# Patient Record
Sex: FEMALE | Race: WHITE | Employment: FULL TIME | URBAN - METROPOLITAN AREA
[De-identification: names, ages, dates, MRNs, and addresses within clinical notes are randomized per-mention and may not be internally consistent; named-entity substitution may affect disease eponyms.]

---

## 2021-04-01 ENCOUNTER — APPOINTMENT (OUTPATIENT)
Dept: CT IMAGING | Age: 46
End: 2021-04-01
Attending: EMERGENCY MEDICINE
Payer: COMMERCIAL

## 2021-04-01 ENCOUNTER — APPOINTMENT (OUTPATIENT)
Dept: GENERAL RADIOLOGY | Age: 46
End: 2021-04-01
Attending: EMERGENCY MEDICINE
Payer: COMMERCIAL

## 2021-04-01 ENCOUNTER — HOSPITAL ENCOUNTER (EMERGENCY)
Age: 46
Discharge: HOME | End: 2021-04-01
Attending: EMERGENCY MEDICINE
Payer: COMMERCIAL

## 2021-04-01 VITALS
WEIGHT: 180 LBS | HEIGHT: 61 IN | RESPIRATION RATE: 16 BRPM | DIASTOLIC BLOOD PRESSURE: 74 MMHG | HEART RATE: 91 BPM | SYSTOLIC BLOOD PRESSURE: 104 MMHG | TEMPERATURE: 98.2 F | BODY MASS INDEX: 33.99 KG/M2 | OXYGEN SATURATION: 98 %

## 2021-04-01 DIAGNOSIS — M54.6 ACUTE LEFT-SIDED THORACIC BACK PAIN: Primary | ICD-10-CM

## 2021-04-01 LAB
ANION GAP SERPL CALC-SCNC: 8 MMOL/L (ref 5–15)
APPEARANCE UR: CLEAR
ATRIAL RATE: 82 BPM
BACTERIA URNS QL MICRO: NEGATIVE /HPF
BASOPHILS # BLD: 0 K/UL (ref 0–0.1)
BASOPHILS NFR BLD: 0 % (ref 0–1)
BILIRUB UR QL: NEGATIVE
BUN SERPL-MCNC: 14 MG/DL (ref 6–20)
BUN/CREAT SERPL: 17 (ref 12–20)
CA-I BLD-MCNC: 9 MG/DL (ref 8.5–10.1)
CALCULATED P AXIS, ECG09: 50 DEGREES
CALCULATED R AXIS, ECG10: 55 DEGREES
CALCULATED T AXIS, ECG11: 62 DEGREES
CHLORIDE SERPL-SCNC: 110 MMOL/L (ref 97–108)
CO2 SERPL-SCNC: 22 MMOL/L (ref 21–32)
COLOR UR: ABNORMAL
CREAT SERPL-MCNC: 0.82 MG/DL (ref 0.55–1.02)
D DIMER PPP FEU-MCNC: 0.41 UG/ML(FEU)
DIAGNOSIS, 93000: NORMAL
DIFFERENTIAL METHOD BLD: NORMAL
EOSINOPHIL # BLD: 0.1 K/UL (ref 0–0.4)
EOSINOPHIL NFR BLD: 1 % (ref 0–7)
ERYTHROCYTE [DISTWIDTH] IN BLOOD BY AUTOMATED COUNT: 12.2 % (ref 11.5–14.5)
GLUCOSE SERPL-MCNC: 78 MG/DL (ref 65–100)
GLUCOSE UR STRIP.AUTO-MCNC: NEGATIVE MG/DL
HCT VFR BLD AUTO: 41.2 % (ref 35–47)
HGB BLD-MCNC: 14.4 G/DL (ref 11.5–16)
HGB UR QL STRIP: NEGATIVE
IMM GRANULOCYTES # BLD AUTO: 0 K/UL (ref 0–0.04)
IMM GRANULOCYTES NFR BLD AUTO: 0 % (ref 0–0.5)
KETONES UR QL STRIP.AUTO: NEGATIVE MG/DL
LEUKOCYTE ESTERASE UR QL STRIP.AUTO: NEGATIVE
LYMPHOCYTES # BLD: 2.1 K/UL (ref 0.8–3.5)
LYMPHOCYTES NFR BLD: 23 % (ref 12–49)
MCH RBC QN AUTO: 32.4 PG (ref 26–34)
MCHC RBC AUTO-ENTMCNC: 35 G/DL (ref 30–36.5)
MCV RBC AUTO: 92.8 FL (ref 80–99)
MONOCYTES # BLD: 0.6 K/UL (ref 0–1)
MONOCYTES NFR BLD: 7 % (ref 5–13)
NEUTS SEG # BLD: 6.4 K/UL (ref 1.8–8)
NEUTS SEG NFR BLD: 69 % (ref 32–75)
NITRITE UR QL STRIP.AUTO: NEGATIVE
NRBC # BLD: 0 K/UL (ref 0–0.01)
NRBC BLD-RTO: 0 PER 100 WBC
P-R INTERVAL, ECG05: 124 MS
PH UR STRIP: >8.5 [PH] (ref 5–8)
PLATELET # BLD AUTO: 232 K/UL (ref 150–400)
PMV BLD AUTO: 10.3 FL (ref 8.9–12.9)
POTASSIUM SERPL-SCNC: 3.5 MMOL/L (ref 3.5–5.1)
PROT UR STRIP-MCNC: NEGATIVE MG/DL
Q-T INTERVAL, ECG07: 372 MS
QRS DURATION, ECG06: 72 MS
QTC CALCULATION (BEZET), ECG08: 434 MS
RBC # BLD AUTO: 4.44 M/UL (ref 3.8–5.2)
RBC #/AREA URNS HPF: ABNORMAL /HPF (ref 0–5)
SODIUM SERPL-SCNC: 140 MMOL/L (ref 136–145)
SP GR UR REFRACTOMETRY: <1.005 (ref 1–1.03)
TROPONIN I SERPL-MCNC: <0.05 NG/ML
UROBILINOGEN UR QL STRIP.AUTO: 0.1 EU/DL (ref 0.1–1)
VENTRICULAR RATE, ECG03: 82 BPM
WBC # BLD AUTO: 9.1 K/UL (ref 3.6–11)
WBC URNS QL MICRO: ABNORMAL /HPF (ref 0–4)

## 2021-04-01 PROCEDURE — 81001 URINALYSIS AUTO W/SCOPE: CPT

## 2021-04-01 PROCEDURE — 71045 X-RAY EXAM CHEST 1 VIEW: CPT

## 2021-04-01 PROCEDURE — 84484 ASSAY OF TROPONIN QUANT: CPT

## 2021-04-01 PROCEDURE — 74176 CT ABD & PELVIS W/O CONTRAST: CPT

## 2021-04-01 PROCEDURE — 93005 ELECTROCARDIOGRAM TRACING: CPT

## 2021-04-01 PROCEDURE — 80048 BASIC METABOLIC PNL TOTAL CA: CPT

## 2021-04-01 PROCEDURE — 85025 COMPLETE CBC W/AUTO DIFF WBC: CPT

## 2021-04-01 PROCEDURE — 36415 COLL VENOUS BLD VENIPUNCTURE: CPT

## 2021-04-01 PROCEDURE — 85379 FIBRIN DEGRADATION QUANT: CPT

## 2021-04-01 PROCEDURE — 99285 EMERGENCY DEPT VISIT HI MDM: CPT

## 2021-04-01 RX ORDER — METHOCARBAMOL 500 MG/1
500 TABLET, FILM COATED ORAL 4 TIMES DAILY
Qty: 10 TAB | Refills: 0 | Status: SHIPPED | OUTPATIENT
Start: 2021-04-01

## 2021-04-01 RX ORDER — NAPROXEN 500 MG/1
500 TABLET ORAL
Qty: 6 TAB | Refills: 0 | Status: SHIPPED | OUTPATIENT
Start: 2021-04-01

## 2021-04-01 NOTE — ED PROVIDER NOTES
EMERGENCY DEPARTMENT HISTORY AND PHYSICAL EXAM      Date: 4/1/2021  Patient Name: Michael Quiñones      History of Presenting Illness     Chief Complaint   Patient presents with    Back Pain       History Provided By: Patient    HPI: Michael Quiñones, 41 y.o. female with a past medical history significant for pulmonary embolism and she remains on Eliquis who Presents to the ED with cc of left upper back pain that was sharp that started today. She said it did not have any exacerbating or relieving factors and did not radiate. She did not treated with anything but rather called EMS immediately. She is not had anything like this before and she denies any fever, chills, nausea, vomiting, chest pain, shortness of breath, rash, diarrhea, headache, night sweats, weight loss. Symptoms are mild to moderate and much improved at this point time    There are no other complaints, changes, or physical findings at this time. PCP: Unknown, Provider        Past History     Past Medical History:  Past Medical History:   Diagnosis Date    Thromboembolus [facility])        Past Surgical History:  Past Surgical History:   Procedure Laterality Date    HX GYN         Family History:  No family history on file. Social History:  Social History     Tobacco Use    Smoking status: Never Smoker    Smokeless tobacco: Never Used   Substance Use Topics    Alcohol use: Not on file    Drug use: Not on file       Allergies: Allergies   Allergen Reactions    Sulfa (Sulfonamide Antibiotics) Hives         Review of Systems     Review of Systems   Constitutional: Negative. Negative for appetite change, chills, fatigue and fever. HENT: Negative. Negative for congestion and sinus pain. Eyes: Negative. Negative for pain and visual disturbance. Respiratory: Negative. Negative for chest tightness and shortness of breath. Cardiovascular: Negative. Negative for chest pain. Gastrointestinal: Negative.   Negative for abdominal pain, diarrhea, nausea and vomiting. Genitourinary: Negative. Negative for difficulty urinating. No discharge   Musculoskeletal: Positive for back pain. Negative for arthralgias. Skin: Negative. Negative for rash. Neurological: Negative. Negative for weakness and headaches. Hematological: Negative. Psychiatric/Behavioral: Negative. Negative for agitation. The patient is not nervous/anxious. All other systems reviewed and are negative. Physical Exam     Physical Exam  Vitals signs and nursing note reviewed. Constitutional:       General: She is not in acute distress. Appearance: She is well-developed. HENT:      Head: Normocephalic and atraumatic. Nose: Nose normal.      Mouth/Throat:      Mouth: Mucous membranes are moist.      Pharynx: Oropharynx is clear. No oropharyngeal exudate. Eyes:      General:         Right eye: No discharge. Left eye: No discharge. Conjunctiva/sclera: Conjunctivae normal.      Pupils: Pupils are equal, round, and reactive to light. Neck:      Musculoskeletal: Normal range of motion and neck supple. Cardiovascular:      Rate and Rhythm: Normal rate and regular rhythm. Chest Wall: PMI is not displaced. No thrill. Heart sounds: Normal heart sounds. No murmur. No friction rub. No gallop. Pulmonary:      Effort: Pulmonary effort is normal. No respiratory distress. Breath sounds: Normal breath sounds. No wheezing or rales. Chest:      Chest wall: No tenderness. Abdominal:      General: Bowel sounds are normal. There is no distension. Palpations: Abdomen is soft. There is no mass. Tenderness: There is no abdominal tenderness. There is no guarding or rebound. Musculoskeletal: Normal range of motion. Lymphadenopathy:      Cervical: No cervical adenopathy. Skin:     General: Skin is warm and dry. Capillary Refill: Capillary refill takes less than 2 seconds. Findings: No erythema or rash. Neurological:      Mental Status: She is alert and oriented to person, place, and time. Cranial Nerves: No cranial nerve deficit. Coordination: Coordination normal.   Psychiatric:         Mood and Affect: Mood normal.         Behavior: Behavior normal.         Lab and Diagnostic Study Results     Labs -     Recent Results (from the past 12 hour(s))   CBC WITH AUTOMATED DIFF    Collection Time: 04/01/21 12:00 PM   Result Value Ref Range    WBC 9.1 3.6 - 11.0 K/uL    RBC 4.44 3.80 - 5.20 M/uL    HGB 14.4 11.5 - 16.0 g/dL    HCT 41.2 35.0 - 47.0 %    MCV 92.8 80.0 - 99.0 FL    MCH 32.4 26.0 - 34.0 PG    MCHC 35.0 30.0 - 36.5 g/dL    RDW 12.2 11.5 - 14.5 %    PLATELET 696 215 - 483 K/uL    MPV 10.3 8.9 - 12.9 FL    NRBC 0.0 0  WBC    ABSOLUTE NRBC 0.00 0.00 - 0.01 K/uL    NEUTROPHILS 69 32 - 75 %    LYMPHOCYTES 23 12 - 49 %    MONOCYTES 7 5 - 13 %    EOSINOPHILS 1 0 - 7 %    BASOPHILS 0 0 - 1 %    IMMATURE GRANULOCYTES 0 0.0 - 0.5 %    ABS. NEUTROPHILS 6.4 1.8 - 8.0 K/UL    ABS. LYMPHOCYTES 2.1 0.8 - 3.5 K/UL    ABS. MONOCYTES 0.6 0.0 - 1.0 K/UL    ABS. EOSINOPHILS 0.1 0.0 - 0.4 K/UL    ABS. BASOPHILS 0.0 0.0 - 0.1 K/UL    ABS. IMM.  GRANS. 0.0 0.00 - 0.04 K/UL    DF AUTOMATED     METABOLIC PANEL, BASIC    Collection Time: 04/01/21 12:00 PM   Result Value Ref Range    Sodium 140 136 - 145 mmol/L    Potassium 3.5 3.5 - 5.1 mmol/L    Chloride 110 (H) 97 - 108 mmol/L    CO2 22 21 - 32 mmol/L    Anion gap 8 5 - 15 mmol/L    Glucose 78 65 - 100 mg/dL    BUN 14 6 - 20 mg/dL    Creatinine 0.82 0.55 - 1.02 mg/dL    BUN/Creatinine ratio 17 12 - 20      GFR est AA >60 >60 ml/min/1.73m2    GFR est non-AA >60 >60 ml/min/1.73m2    Calcium 9.0 8.5 - 10.1 mg/dL   URINALYSIS W/MICROSCOPIC    Collection Time: 04/01/21 12:00 PM   Result Value Ref Range    Color Yellow/Straw      Appearance Clear Clear      Specific gravity <1.005 1.003 - 1.030    pH (UA) >8.5 (H) 5.0 - 8.0    Protein Negative Negative mg/dL    Glucose Negative Negative mg/dL    Ketone Negative Negative mg/dL    Bilirubin Negative Negative      Blood Negative Negative      Urobilinogen 0.1 0.1 - 1.0 EU/dL    Nitrites Negative Negative      Leukocyte Esterase Negative Negative      WBC 0-4 0 - 4 /hpf    RBC 0-5 0 - 5 /hpf    Bacteria Negative Negative /hpf   TROPONIN I    Collection Time: 04/01/21 12:00 PM   Result Value Ref Range    Troponin-I, Qt. <0.05 <0.05 ng/mL   D DIMER    Collection Time: 04/01/21 12:00 PM   Result Value Ref Range    D DIMER 0.41 <0.50 ug/ml(FEU)   EKG, 12 LEAD, INITIAL    Collection Time: 04/01/21 12:00 PM   Result Value Ref Range    Ventricular Rate 82 BPM    Atrial Rate 82 BPM    P-R Interval 124 ms    QRS Duration 72 ms    Q-T Interval 372 ms    QTC Calculation (Bezet) 434 ms    Calculated P Axis 50 degrees    Calculated R Axis 55 degrees    Calculated T Axis 62 degrees    Diagnosis       Normal sinus rhythm  Nonspecific ST abnormality  Abnormal ECG    Confirmed by Gregoria Golden ([id number]) on 4/1/2021 12:21:22 PM         Radiologic Studies -   [unfilled]  CT Results  (Last 48 hours)               04/01/21 1403  CT ABD PELV WO CONT Final result    Impression:  Small volume free fluid lower pelvis. 3.5 cm oblong cystic structure right ovary, likely complex ovarian cyst.       No calcified stone either kidney. No hydronephrosis. No bowel obstruction. No CT evidence for appendicitis or diverticulitis. Narrative:  CT abdomen and pelvis without IV contrast.       Axial images are reviewed along with reformatted sagittal/coronal images. Dose reduction: All CT scans at this facility are performed using dose reduction   optimization techniques as appropriate to a performed exam including the   following-   automated exposure control, adjustments of mA and/or Kv according to patient   size, or use of iterative reconstructive technique. Lung bases are clear.         Nonenhanced images reveal demarcated 1 cm hypodense site left lobe liver,   probable cyst. Gallbladder nondistended. Normal volume spleen. Pancreas,   bilateral adrenal glands, and bilateral kidneys appear unremarkable on this   nonenhanced study. No calcified stone either kidney. No hydronephrosis. Stomach and small bowel loops are decompressed. Appendix appears unremarkable. There is stool and air through colon. Small volume free fluid lower pelvis. [place] 3.5 cm cystic structure right   pelvis probable complex right ovarian cyst.       Normal caliber abdominal aorta. Tiny fat-containing periumbilical hernia. CXR Results  (Last 48 hours)               04/01/21 1310  XR CHEST PORT Final result    Impression:  The cardiomediastinal silhouette is appropriate for age, technique,   and lung expansion. Pulmonary vasculature is not congested. The lungs are   essentially clear. No effusion or pneumothorax is seen. Narrative:  1 view                 Medical Decision Making and ED Course   - I am the first and primary provider for this patient AND AM THE PRIMARY PROVIDER OF RECORD. - I reviewed the vital signs, available nursing notes, past medical history, past surgical history, family history and social history. - Initial assessment performed. The patients presenting problems have been discussed, and the staff are in agreement with the care plan formulated and outlined with them. I have encouraged them to ask questions as they arise throughout their visit. Vital Signs-Reviewed the patient's vital signs. Patient Vitals for the past 12 hrs:   Temp Pulse Resp BP SpO2   04/01/21 1418  91 16 104/74 98 %   04/01/21 1204     100 %   04/01/21 1204 98.2 °F (36.8 °C) 84 16 99/68 100 %   04/01/21 1152 98.2 °F (36.8 °C) 84 20 (!) 102/57 99 %       EKG interpretation: (Preliminary): Performed at 12:00, and read at 003  Ventricular rate 82 bpm,  ms, QRS duration 72 ms,  ms.   Interpretation: Normal sinus rhythm nonspecific ST abnormality. No abnormal EKG. Records Reviewed: Nursing Notes    The patient presents with back pain with a differential diagnosis of PE, kidney stone, muscle spasm. ED Course:       ED Course as of Apr 01 1538   Thu Apr 01, 2021   1532 She states her symptoms have completely resolved. She says it went from her left upper back to her right lower quadrant in the back but has since resolved. We have discussed all of her results and she conveys understanding at this point time. She has a follow-up with urology tomorrow    [CS]      ED Course User Index  [CS] Conchis Raya MD         Provider Notes (Medical Decision Making):   Consent resolved. Will treat with musculoskeletal  MDM           Consultations:       Consultations: - NONE        Procedures and Critical Care       Performed by: Trev Gonzalez MD  PROCEDURES:  Procedures         Disposition     Disposition: Condition stable    Discharged    Remove if not discharged  DISCHARGE PLAN:  1. There are no discharge medications for this patient. 2.   Follow-up Information     Follow up With Specialties Details Why [facility] EMERGENCY DEPT Emergency Medicine Call  As needed, If symptoms worsen [address] [address]  [phone]        3. Return to ED if worse   4. Current Discharge Medication List      START taking these medications    Details   naproxen (NAPROSYN) 500 mg tablet Take 1 Tab by mouth every twelve (12) hours as needed for Pain. Qty: 6 Tab, Refills: 0      methocarbamoL (Robaxin) 500 mg tablet Take 1 Tab by mouth four (4) times daily. Qty: 10 Tab, Refills: 0             Diagnosis     Clinical Impression:   1. Acute left-sided thoracic back pain        Attestations:    Trev Gonzalez MD    Please note that this dictation was completed with [employer], the [employer] voice recognition software.   Quite often unanticipated grammatical, syntax, homophones, and other interpretive errors are inadvertently transcribed by the computer software. Please disregard these errors. Please excuse any errors that have escaped final proofreading. Thank you.

## 2021-04-01 NOTE — DISCHARGE INSTRUCTIONS
Thank you! Thank you for allowing me to care for you in the emergency department. I sincerely hope that you are satisfied with your visit today. It is my goal to provide you with excellent care. Below you will find a list of your labs and imaging from your visit today. Should you have any questions regarding these results please do not hesitate to call the emergency department. Labs -     Recent Results (from the past 12 hour(s))   CBC WITH AUTOMATED DIFF    Collection Time: 04/01/21 12:00 PM   Result Value Ref Range    WBC 9.1 3.6 - 11.0 K/uL    RBC 4.44 3.80 - 5.20 M/uL    HGB 14.4 11.5 - 16.0 g/dL    HCT 41.2 35.0 - 47.0 %    MCV 92.8 80.0 - 99.0 FL    MCH 32.4 26.0 - 34.0 PG    MCHC 35.0 30.0 - 36.5 g/dL    RDW 12.2 11.5 - 14.5 %    PLATELET 698 252 - 034 K/uL    MPV 10.3 8.9 - 12.9 FL    NRBC 0.0 0  WBC    ABSOLUTE NRBC 0.00 0.00 - 0.01 K/uL    NEUTROPHILS 69 32 - 75 %    LYMPHOCYTES 23 12 - 49 %    MONOCYTES 7 5 - 13 %    EOSINOPHILS 1 0 - 7 %    BASOPHILS 0 0 - 1 %    IMMATURE GRANULOCYTES 0 0.0 - 0.5 %    ABS. NEUTROPHILS 6.4 1.8 - 8.0 K/UL    ABS. LYMPHOCYTES 2.1 0.8 - 3.5 K/UL    ABS. MONOCYTES 0.6 0.0 - 1.0 K/UL    ABS. EOSINOPHILS 0.1 0.0 - 0.4 K/UL    ABS. BASOPHILS 0.0 0.0 - 0.1 K/UL    ABS. IMM.  GRANS. 0.0 0.00 - 0.04 K/UL    DF AUTOMATED     METABOLIC PANEL, BASIC    Collection Time: 04/01/21 12:00 PM   Result Value Ref Range    Sodium 140 136 - 145 mmol/L    Potassium 3.5 3.5 - 5.1 mmol/L    Chloride 110 (H) 97 - 108 mmol/L    CO2 22 21 - 32 mmol/L    Anion gap 8 5 - 15 mmol/L    Glucose 78 65 - 100 mg/dL    BUN 14 6 - 20 mg/dL    Creatinine 0.82 0.55 - 1.02 mg/dL    BUN/Creatinine ratio 17 12 - 20      GFR est AA >60 >60 ml/min/1.73m2    GFR est non-AA >60 >60 ml/min/1.73m2    Calcium 9.0 8.5 - 10.1 mg/dL   URINALYSIS W/MICROSCOPIC    Collection Time: 04/01/21 12:00 PM   Result Value Ref Range    Color Yellow/Straw      Appearance Clear Clear      Specific gravity <1.005 1.003 - 1.030    pH (UA) >8.5 (H) 5.0 - 8.0    Protein Negative Negative mg/dL    Glucose Negative Negative mg/dL    Ketone Negative Negative mg/dL    Bilirubin Negative Negative      Blood Negative Negative      Urobilinogen 0.1 0.1 - 1.0 EU/dL    Nitrites Negative Negative      Leukocyte Esterase Negative Negative      WBC 0-4 0 - 4 /hpf    RBC 0-5 0 - 5 /hpf    Bacteria Negative Negative /hpf   TROPONIN I    Collection Time: 04/01/21 12:00 PM   Result Value Ref Range    Troponin-I, Qt. <0.05 <0.05 ng/mL   D DIMER    Collection Time: 04/01/21 12:00 PM   Result Value Ref Range    D DIMER 0.41 <0.50 ug/ml(FEU)   EKG, 12 LEAD, INITIAL    Collection Time: 04/01/21 12:00 PM   Result Value Ref Range    Ventricular Rate 82 BPM    Atrial Rate 82 BPM    P-R Interval 124 ms    QRS Duration 72 ms    Q-T Interval 372 ms    QTC Calculation (Bezet) 434 ms    Calculated P Axis 50 degrees    Calculated R Axis 55 degrees    Calculated T Axis 62 degrees    Diagnosis       Normal sinus rhythm  Nonspecific ST abnormality  Abnormal ECG    Confirmed by Imelda Noland ([id number]) on 4/1/2021 12:21:22 PM         Radiologic Studies -   CT ABD PELV WO CONT   Final Result   Small volume free fluid lower pelvis. 3.5 cm oblong cystic structure right ovary, likely complex ovarian cyst.      No calcified stone either kidney. No hydronephrosis. No bowel obstruction. No CT evidence for appendicitis or diverticulitis. XR CHEST PORT   Final Result   The cardiomediastinal silhouette is appropriate for age, technique,   and lung expansion. Pulmonary vasculature is not congested. The lungs are   essentially clear. No effusion or pneumothorax is seen. CT Results  (Last 48 hours)                 04/01/21 1403  CT ABD PELV WO CONT Final result    Impression:  Small volume free fluid lower pelvis. 3.5 cm oblong cystic structure right ovary, likely complex ovarian cyst.       No calcified stone either kidney. No hydronephrosis.        No bowel obstruction. No CT evidence for appendicitis or diverticulitis. Narrative:  CT abdomen and pelvis without IV contrast.       Axial images are reviewed along with reformatted sagittal/coronal images. Dose reduction: All CT scans at this facility are performed using dose reduction   optimization techniques as appropriate to a performed exam including the   following-   automated exposure control, adjustments of mA and/or Kv according to patient   size, or use of iterative reconstructive technique. Lung bases are clear. Nonenhanced images reveal demarcated 1 cm hypodense site left lobe liver,   probable cyst. Gallbladder nondistended. Normal volume spleen. Pancreas,   bilateral adrenal glands, and bilateral kidneys appear unremarkable on this   nonenhanced study. No calcified stone either kidney. No hydronephrosis. Stomach and small bowel loops are decompressed. Appendix appears unremarkable. There is stool and air through colon. Small volume free fluid lower pelvis. [place] 3.5 cm cystic structure right   pelvis probable complex right ovarian cyst.       Normal caliber abdominal aorta. Tiny fat-containing periumbilical hernia. CXR Results  (Last 48 hours)                 04/01/21 1310  XR CHEST PORT Final result    Impression:  The cardiomediastinal silhouette is appropriate for age, technique,   and lung expansion. Pulmonary vasculature is not congested. The lungs are   essentially clear. No effusion or pneumothorax is seen. Narrative:  1 view                      If you feel that you have not received excellent quality care or timely care, please ask to speak to the nurse manager. Please choose us in the future for your continued health care needs.    ------------------------------------------------------------------------------------------------------------  The exam and treatment you received in the Emergency Department were for an urgent problem and are not intended as complete care. It is important that you follow-up with a doctor, nurse practitioner, or physician assistant to:  (1) confirm your diagnosis,  (2) re-evaluation of changes in your illness and treatment, and  (3) for ongoing care. If your symptoms become worse or you do not improve as expected and you are unable to reach your usual health care provider, you should return to the Emergency Department. We are available 24 hours a day. Please take your discharge instructions with you when you go to your follow-up appointment. If you have any problem arranging a follow-up appointment, contact the Emergency Department immediately. If a prescription has been provided, please have it filled as soon as possible to prevent a delay in treatment. Read the entire medication instruction sheet provided to you by the pharmacy. If you have any questions or reservations about taking the medication due to side effects or interactions with other medications, please call your primary care physician or contact the ER to speak with the charge nurse. Make an appointment with your family doctor or the physician you were referred to for follow-up of this visit as instructed on your discharge paperwork, as this is a mandatory follow-up. Return to the ER if you are unable to be seen or if you are unable to be seen in a timely manner. If you have any problem arranging the follow-up visit, contact the Emergency Department immediately.

## 2021-04-01 NOTE — ED TRIAGE NOTES
Pt started with increased lower back pain last, radiated to sides, pt recently treated for uti, finished antibiotic. Upon ems arrival pt became to get short winded, was able to be stabilized by oxygen, currently on room air at this time states she is feeling better.

## 2023-05-12 RX ORDER — NAPROXEN 500 MG/1
TABLET ORAL
COMMUNITY
Start: 2021-04-01

## 2023-05-12 RX ORDER — METHOCARBAMOL 500 MG/1
TABLET, FILM COATED ORAL 4 TIMES DAILY
COMMUNITY
Start: 2021-04-01

## 2024-08-13 ENCOUNTER — OFFICE VISIT (OUTPATIENT)
Age: 49
End: 2024-08-13

## 2024-08-13 VITALS
TEMPERATURE: 97.6 F | OXYGEN SATURATION: 98 % | HEART RATE: 96 BPM | DIASTOLIC BLOOD PRESSURE: 76 MMHG | SYSTOLIC BLOOD PRESSURE: 114 MMHG

## 2024-08-13 DIAGNOSIS — L50.9 HIVES: Primary | ICD-10-CM

## 2024-08-13 RX ORDER — HYDROCODONE BITARTRATE AND ACETAMINOPHEN 2.5; 108 MG/5ML; MG/5ML
SOLUTION ORAL
COMMUNITY
Start: 2023-08-29

## 2024-08-13 RX ORDER — DEXAMETHASONE SODIUM PHOSPHATE 10 MG/ML
10 INJECTION, SOLUTION INTRAMUSCULAR; INTRAVENOUS ONCE
Status: COMPLETED | OUTPATIENT
Start: 2024-08-13 | End: 2024-08-13

## 2024-08-13 RX ADMIN — DEXAMETHASONE SODIUM PHOSPHATE 10 MG: 10 INJECTION, SOLUTION INTRAMUSCULAR; INTRAVENOUS at 15:54

## 2024-08-13 ASSESSMENT — ENCOUNTER SYMPTOMS
SORE THROAT: 0
WHEEZING: 0
TROUBLE SWALLOWING: 1
CHEST TIGHTNESS: 1
SHORTNESS OF BREATH: 0

## 2024-08-13 NOTE — PROGRESS NOTES
2024   Ita Small (: 1975) is a 48 y.o. female, New patient, here for evaluation of the following chief complaint(s):  Allergic Reaction (Acute onset of hives and radiated to face. Nausea and throat \"not feeling normal\". )     ASSESSMENT/PLAN:  Below is the assessment and plan developed based on review of pertinent history, physical exam, labs, studies, and medications.  1. Hives  -     dexAMETHasone (PF) (DECADRON) injection 10 mg; 10 mg, IntraMUSCular, ONCE, 1 dose, On 24 at 1515     -  Zyrtec/Claritin/Benadryl - for itching/ inflammation  -  Pepcid (H2 Blocker) for itching/inflammation  -  cool compresses/soaks     Please go to the ED for any difficulty breathing, shortness of breath; feeling like throat closing.    Handout given with care instructions  2. OTC for symptom management. Increase fluid intake, ensure adequate nutritional intake.  3. Follow up with PCP as needed.  4. Go to ED with development of any acute symptoms.     Follow up:  Return if symptoms worsen or fail to improve.  Follow up immediately for any new, worsening or changes or if symptoms are not improving over the next 5-7 days.     SUBJECTIVE/OBJECTIVE:  HPI     Diagnoses and all orders for this visit:  Hives  -     dexAMETHasone (PF) (DECADRON) injection 10 mg      Allergic Reaction (Acute onset of hives and radiated to face. Nausea and throat \"not feeling normal\". )      Pt woke and noticed diffuse itching and hive-like rash.  She had surgery at the beginning of August and developed a reaction to the adhesive dressing.  She is on liquid hydrocodone for pain but that medication is over a year old (from a prior surgery) because her insurance would not cover it for this surgery.  She has never had any difficulty with the hydrocodone in the past.  She felt like her throat was \"tightening\" but thinks it may have been an anxiety reaction not true swelling.  She denies SOB, dyspnea, wheezing.      Review of Systems

## 2024-08-13 NOTE — PATIENT INSTRUCTIONS
-  Zyrtec/Claritin/Benadryl - for itching/ inflammation  -  Pepcid (H2 Blocker) for itching/inflammation  -  cool compresses/soaks     Please go to the ED for any difficulty breathing, shortness of breath; feeling like throat closing.